# Patient Record
Sex: FEMALE | Race: WHITE | Employment: FULL TIME | URBAN - METROPOLITAN AREA
[De-identification: names, ages, dates, MRNs, and addresses within clinical notes are randomized per-mention and may not be internally consistent; named-entity substitution may affect disease eponyms.]

---

## 2019-10-24 ENCOUNTER — TRANSFERRED RECORDS (OUTPATIENT)
Dept: HEALTH INFORMATION MANAGEMENT | Facility: CLINIC | Age: 34
End: 2019-10-24

## 2019-10-25 ENCOUNTER — MEDICAL CORRESPONDENCE (OUTPATIENT)
Dept: HEALTH INFORMATION MANAGEMENT | Facility: CLINIC | Age: 34
End: 2019-10-25

## 2019-10-28 ENCOUNTER — OFFICE VISIT (OUTPATIENT)
Dept: MATERNAL FETAL MEDICINE | Facility: CLINIC | Age: 34
End: 2019-10-28
Attending: SPECIALIST
Payer: COMMERCIAL

## 2019-10-28 ENCOUNTER — PRE VISIT (OUTPATIENT)
Dept: MATERNAL FETAL MEDICINE | Facility: CLINIC | Age: 34
End: 2019-10-28

## 2019-10-28 ENCOUNTER — OFFICE VISIT (OUTPATIENT)
Dept: MATERNAL FETAL MEDICINE | Facility: CLINIC | Age: 34
End: 2019-10-28
Attending: OBSTETRICS & GYNECOLOGY
Payer: COMMERCIAL

## 2019-10-28 DIAGNOSIS — O26.90 PREGNANCY RELATED CONDITION, ANTEPARTUM: ICD-10-CM

## 2019-10-28 DIAGNOSIS — O26.90 PREGNANCY RELATED CONDITION, ANTEPARTUM: Primary | ICD-10-CM

## 2019-10-28 DIAGNOSIS — Z31.5 ENCOUNTER FOR PROCREATIVE GENETIC COUNSELING: Primary | ICD-10-CM

## 2019-10-28 DIAGNOSIS — O30.041 DICHORIONIC DIAMNIOTIC TWIN PREGNANCY IN FIRST TRIMESTER: Primary | ICD-10-CM

## 2019-10-28 PROCEDURE — 96040 ZZH GENETIC COUNSELING, EACH 30 MINUTES: CPT | Mod: ZF | Performed by: GENETIC COUNSELOR, MS

## 2019-10-28 NOTE — PROGRESS NOTES
[facility]  Genetic Counseling Consult    Patient: Emily Loving YOB: 1985   Date of Service: 10/28/19      Emily Loving was seen at [facility] for genetic consultation to discuss the options for care for twin pregnancy.  Emily was accompanied to her appointment today by her  Aleksandr.        Impression/Plan:   1.  Emily met with genetic counseling to discuss care and genetic testing options for twin pregnancies.  Emily reports that she is still gathering information to make her decision between continuing with a twin pregnancy or having selective fetal reduction.  She reports that the primary factor in her decision making process is mitigating overall risks for the pregnancy and her health.      2.  Emily met briefly with [facility] physician Dr. Hernandez to discuss the details of care for fetal reduction and after their conversation opted to plan for an appointment with [facility] in the 10th week of pregnancy to discuss and arrange appropriate care. Emily was also undecided on if she would pursue CVS prior to selective reduction.      Pregnancy History:   /Parity:    Age at Delivery: 34 year old  ARABELLA: 2020, by Last Menstrual Period  Gestational Age: 7w1d    No significant complications or exposures were reported in the current pregnancy.    Emily s pregnancy history is significant for 1 prior full term pregnancy with no reported complications.    Medical History:   Emily s reported medical history is not expected to impact pregnancy management or risks to fetal development.       Discussion:        Today the primary focus of our conversation was the differences in risks between continuing a twin pregnancy and reduction to weathers.  We discussed that all pregnancy has some inherent risk, and that unfortunately there is nothing that can completely remove the possibility of complications from  a pregnancy.  We discussed that twin gestations are at increased risk for  delivery, gestational diabetes, gestational hypertension, and preeclampsia.  Diego have a family member who delivered a twin pregnancy at ~25 weeks gestation.  This couple lost one twin and the other lives with reportedly profound developmental delays and health complications.  Emily acknowledges that this experience is a significant factor in her risk perceptions for her own pregnancy and how she thinks about risks for twin pregnancies.  We discussed the risks for selective reduction, which is associated with a 6-10% risk for pregnancy loss.  The decrease in relative risk for maternal and  complications after selective reduction is larger than the risk of performing a selective reduction in twin pregnancies.      Emily and Aleksandr had several questions regarding genetic screening and testing.  We discussed that for families pursuing selective reduction, CVS for diagnostic testing is recommended but not required.  This invasive testing does introduce additional risk to the pregnancy, but provides diagnostic health information regarding the genetic status of both twins, which can be beneficial for selecting a fetus for reduction.  We reviewed the limitations of NIPT in twin pregnancies, but discussed that this can be used as an alternative to CVS in some instances.  NIPT cannot distinguish between fetuses in a twin pregnancy however.        We discussed the following options:   Non-invasive Prenatal Testing (NIPT)    Maternal plasma cell-free DNA testing; first trimester ultrasound with nuchal translucency and nasal bone assessment is recommended, when appropriate    Screens for fetal trisomy 21, trisomy 13, trisomy 18, and sex chromosome aneuploidy    Cannot screen for open neural tube defects; maternal serum AFP after 15 weeks is recommended       Chorionic villus sampling (CVS)    Invasive procedure typically  performed in the first trimester by which placental villi are obtained for the purpose of chromosome analysis and/or other prenatal genetic analysis    Diagnostic results; >99% sensitivity for fetal chromosome abnormalities    Cannot test for open neural tube defects; maternal serum AFP after 15 weeks is recommended    The above information was reviewed with [facility] physician Dr. Hernandez, who met briefly with the family to introduce himself and discuss the logistics and technical details of a selective reduction procedure.  After meeting with him, the couple opted to plan to schedule a follow up appointment at 10 weeks gestation to coordinate further care.  All of the couple's questions were answered to her satisfaction today and she was encouraged to reach out to me with any questions or concerns moving forward.              It was a pleasure to be involved with Emily s care. Face-to-face time of the meeting was 30 minutes.      Faizan Guerin MS, [facility]  Licensed Genetic Counselor  Phone: [phone]  Pager: [phone]

## 2019-10-28 NOTE — PROGRESS NOTES
I have performed a consultation on Emily Loving regarding multifetal pregnancy reduction of a dichorionic twin pregnancy to a weathers IUP. Ms. Lovingis a 34 year old,  with an intrauterine pregnancy at 7w1d gestational age. Her LMP was 2019. Dating ultrasound on 2019 reported dichorionic and diamniotic twin gestation at 6w1d with ARABELLA of 2020. She conceived twins by conceived by IVF.     HPI: The patient has a history of a prior term delivery after IVF. She was delivered by  section at 38 weeks of a healthy infant secondary to breech presentation and PROM.    PMH: Medical: No major medical illnesses. Surgical: .   PHYSICAL EXAM: Deferred today. Patient appears alert, oriented.  GENERAL: Well developed, gravid female.    IMPRESSION: Dichorionic and diamniotic twin pregnancy at 7w1d by LMP and 6week US.    RECOMMENDATIONS:  1. We discussed maternal and fetal risks of twin gestation including  delivery, gestational diabetes, gestational hypertension and preeclampsia. We also discussed increased risk for requiring a  delivery and its attendant risks for hemorrhage and infection compared with vaginal delivery. Although the relative risks for these complications are relatively small, they are significant. Ms. Prado previously had a CD for her first pregnancy.  2. We discussed the fetal and  risks including but not limited to  delivery and its complications of CP, IVH, NEC and RDS. The patient related risk with twins without a history of  delivery is approximately 2-3x background risk for women without history of  delivery (12%).   3. We discussed the risks of multifetal pregnancy reduction. Although previously not frequently performed, current estimates are that 10-30% of reductions are performed for twin gestations. The decrease in relative risk of maternal and  complications after selective reduction  is larger than the risk of performing a selective reduction in twins which is associated with a pregnancy loss rate of 6-10%. The earlier the procedure can be performed, the safer the procedure is for the surviving fetus.  4. We discussed the recommendation to obtain screening and preferably diagnostic testing for aneuploidy with NIPT, NT scan or CVS. The patient is considering having CVS prior to reduction.  5. We discussed the procedure itself and risk for pregnancy loss, vaginal bleeding, cramping and rupture of membranes for the reduced or alternate sac. As noted above the overall risk is approximately 6-10%. We would recommend follow up 2 weeks after procedure to evaluate pregnancy. This could be done with primary care provider.  6. I have provided my contact information should any questions arise prior to the procedure.  At the conclusion of the conversation the patient had a clear grasp of the content of our discussion.    Tavares Hernandez MD  Maternal Fetal Medicine    I spent a total of 30 minutes face-to-face with this patient counseling and coordinating care as described above. More than 50% of the time was in coordination of care and discussion of management of care regarding multifetal reduction.    A copy of this consultation is being faxed to your office.

## 2019-10-29 DIAGNOSIS — O30.041 DICHORIONIC DIAMNIOTIC TWIN PREGNANCY IN FIRST TRIMESTER: Primary | ICD-10-CM

## 2019-11-04 ENCOUNTER — TELEPHONE (OUTPATIENT)
Dept: MATERNAL FETAL MEDICINE | Facility: CLINIC | Age: 34
End: 2019-11-04

## 2019-11-04 NOTE — TELEPHONE ENCOUNTER
11/4/2019    Emily called to touch base after an OB appointment earlier today.  She reports that she and Aleksandr are experience high levels of stress surrounding the pregnancy but that they are communicating very well with each other.  She reports that on an ultrasound today was unremarkable for either twin and that both twins seem to be developing without significant concern at this time.  Emily reports that she and her  are still deciding on appropriate course of action for their family, and that she has not completely decided between twin reduction as soon as possible, reduction after CVS, or continuing as a twin pregnancy.  Emily confirmed that she is currently scheduled for a CVS on 11/19/19.  Emily reports that she and Aleksandr have been discussing the implications of continuing as a twin pregnancy vs selective reduction and how either decision would impact them, their children, and their overall plans for their family, both in the short and long term.  I assured Emily that she will ultimately make the decision that is right for her family and our clinic will provide the appropriate care for them moving forward.  Emily expressed some frustration that she does not have many people to discuss this decision with, and reports that those people she would normally discuss important decisions with would not be understanding in this specific situation.  I encouraged Emily to continue discussing with her  and to reach out to her health care providers as needed.      Emily reports that they are still considering reduction without waiting for CVS, and wanted to know what plans/coordination should be started in order to ensure that this care is available as quickly as possible if they make that decision.  Genetic counseling will begin the process of arranging this care, with the understanding that Emily is not decided on any specific care plan yet, and will reach out to her with updates in the  near future.     All of Emily's questions were answered to her satisfaction at this time and she was encouraged to reach out with any questions or concerns.    Faizan Guerin MS, [facility]  Licensed Genetic Counselor  Phone: [phone]  Pager: [phone]

## 2019-11-15 ENCOUNTER — TELEPHONE (OUTPATIENT)
Dept: MATERNAL FETAL MEDICINE | Facility: CLINIC | Age: 34
End: 2019-11-15

## 2019-11-15 DIAGNOSIS — O30.041 DICHORIONIC DIAMNIOTIC TWIN PREGNANCY IN FIRST TRIMESTER: Primary | ICD-10-CM

## 2019-11-15 NOTE — TELEPHONE ENCOUNTER
11/15/2019    Called Emily to discuss her upcoming appointments with maternal fetal medicine.  Emily confirmed that she is planning to have a CVS as a part of those appointments to provide additional information for their decision regarding reducing to weathers pregnancy.  We discussed that as a part of this our clinic needs a confirmed blood type and that orders have been placed for this.  Emily will go to the lab for a STAT type and screen the morning of her CVS prior to her appointments in [facility].     We briefly reviewed the timelines for CVS results, with FISH results expected in 2-3 days and final results coming ~1 week after FISH results.      We reviewed that if Emily is planning to wait until after diagnostic test results are back before having a reduction, this care would likely take place the first week of December.  We discussed that there that as a part of coordinating this care Dr. Hernandez would review information regarding fetal reduction with Emily at her next appointment.  We also discussed that [place] financial counselors would investigate coverage for care for Emily, and she requested that a financial inquiry be started now for information purposes.      Emily asked for the CPT and ICD-10 codes for genetic testing and genetic counseling will collect this information and reach out to Emily ASAP.     All of Emily's questions were answered to her satisfaction at this time and she was encouraged to contact me with any questions or concerns.      Faizan Guerin MS, [facility]  Licensed Genetic Counselor  Phone: [phone]  Pager: [phone]

## 2019-11-18 ENCOUNTER — TELEPHONE (OUTPATIENT)
Dept: MATERNAL FETAL MEDICINE | Facility: CLINIC | Age: 34
End: 2019-11-18

## 2019-11-18 NOTE — TELEPHONE ENCOUNTER
11/18/2019    Called Emily to discuss her upcoming appointments with maternal fetal medicine.  We discussed the following topics:    Per our financial investigation, termination of pregnancy is a covered benefit whether elective or medically necessary.  We discussed that there may still be an out of pocket cost associated with her care.  Emily was grateful for this information at this time.     Emily requested the CPT codes and diagnosis codes for her CVS appointment and testing tomorrow, and was given the following information:    CVS CPT codes:   03257 CVS   46070 CVS US guide   [address]95 [facility] tri US   [address]774       Genetic testing on CVS specimen: 88271 x5; 88275 x5; 01287  35490, 88474; 97883,20086       Diagnosis codes o30.041 & Z31.49.    Emily returned my call and reports that all codes are covered except 23457, which requires a precertification initiated by our clinic.  I called Jyoti at Emily's request and began the precertification process, reference number [id number]    All of Emily's questions were answered to her satisfaction and she had no further questions or concerns at this time.     Faizan Guerin MS, [facility]  Licensed Genetic Counselor  Phone: [phone]  Pager: [phone]

## 2019-11-19 ENCOUNTER — OFFICE VISIT (OUTPATIENT)
Dept: MATERNAL FETAL MEDICINE | Facility: CLINIC | Age: 34
End: 2019-11-19
Attending: OBSTETRICS & GYNECOLOGY
Payer: COMMERCIAL

## 2019-11-19 ENCOUNTER — HOSPITAL ENCOUNTER (OUTPATIENT)
Dept: ULTRASOUND IMAGING | Facility: CLINIC | Age: 34
Discharge: HOME | End: 2019-11-19
Attending: OBSTETRICS & GYNECOLOGY | Admitting: OBSTETRICS & GYNECOLOGY
Payer: COMMERCIAL

## 2019-11-19 DIAGNOSIS — O30.041 DICHORIONIC DIAMNIOTIC TWIN PREGNANCY IN FIRST TRIMESTER: ICD-10-CM

## 2019-11-19 DIAGNOSIS — O30.041 DICHORIONIC DIAMNIOTIC TWIN PREGNANCY IN FIRST TRIMESTER: Primary | ICD-10-CM

## 2019-11-19 DIAGNOSIS — Z31.49 INVESTIGATION AND TESTING FOR PROCREATION MANAGEMENT: ICD-10-CM

## 2019-11-19 DIAGNOSIS — Z31.49 PROCREATION MANAGEMENT INVESTIGATION AND TESTING: ICD-10-CM

## 2019-11-19 DIAGNOSIS — Z31.49 ENCOUNTER FOR OTHER PROCREATIVE INVESTIGATION AND TESTING: ICD-10-CM

## 2019-11-19 LAB
ABO + RH BLD: NORMAL
ABO + RH BLD: NORMAL
BLD GP AB SCN SERPL QL: NORMAL
BLOOD BANK CMNT PATIENT-IMP: NORMAL
SPECIMEN EXP DATE BLD: NORMAL

## 2019-11-19 PROCEDURE — 81229 CYTOG ALYS CHRML ABNR SNPCGH: CPT | Performed by: OBSTETRICS & GYNECOLOGY

## 2019-11-19 PROCEDURE — 81265 STR MARKERS SPECIMEN ANAL: CPT | Performed by: OBSTETRICS & GYNECOLOGY

## 2019-11-19 PROCEDURE — 86850 RBC ANTIBODY SCREEN: CPT | Performed by: OBSTETRICS & GYNECOLOGY

## 2019-11-19 PROCEDURE — 84999 UNLISTED CHEMISTRY PROCEDURE: CPT | Performed by: OBSTETRICS & GYNECOLOGY

## 2019-11-19 PROCEDURE — 86901 BLOOD TYPING SEROLOGIC RH(D): CPT | Performed by: OBSTETRICS & GYNECOLOGY

## 2019-11-19 PROCEDURE — 76945 ECHO GUIDE VILLUS SAMPLING: CPT | Performed by: OBSTETRICS & GYNECOLOGY

## 2019-11-19 PROCEDURE — 76801 OB US < 14 WKS SINGLE FETUS: CPT

## 2019-11-19 PROCEDURE — 86900 BLOOD TYPING SEROLOGIC ABO: CPT | Performed by: OBSTETRICS & GYNECOLOGY

## 2019-11-19 PROCEDURE — 40001082 ZZHCL STATISTIC MATERNAL CELL CONTAM MOLEC: Performed by: OBSTETRICS & GYNECOLOGY

## 2019-11-19 PROCEDURE — 36416 COLLJ CAPILLARY BLOOD SPEC: CPT | Performed by: OBSTETRICS & GYNECOLOGY

## 2019-11-19 PROCEDURE — 96040 ZZH GENETIC COUNSELING, EACH 30 MINUTES: CPT | Mod: ZF | Performed by: GENETIC COUNSELOR, MS

## 2019-11-19 NOTE — PROGRESS NOTES
[facility] [place]  Genetic Counseling Consult    Patient: Emily Loving YOB: 1985   Date of Service: 11/19/19      Emily Loving was seen at [facility] [place] for genetic consultation as a part of her appointment for CVS in twin pregnancy.       Impression/Plan:   1.  Emily had an ultrasound and CVS procedure.  The following testing was ordered on the prenatal sample: Chromosome Microarray.  Results are expected within 7-10 days, and will be available in [facility].  We will contact her to discuss the results, and a copy will be forwarded to the office of the referring OB provider.  Emily will be contacted at the phone number she provided, [phone], and requests that detailed results be left in her voicemail if she cannot be reached.    2.  Results will be reported separately for Twin A and Twin B.  We discussed that due to the upcoming Thanksgiving holiday there may be a slight delay in getting her test results back, and that genetic counseling will plan to update Emily with her test status on Wednesday, November 27.      Risk Assessment for Chromosome Conditions:   We explained that the risk for fetal chromosome abnormalities increases with maternal age. We discussed specific features of common chromosome abnormalities, including Down syndrome, trisomy 13, trisomy 18, and sex chromosome trisomies.      - At age 34 at midtrimester, the risk to have a baby with Down syndrome is 1 in 421.     - At age 34 at midtrimester, the risk to have a baby with any chromosome abnormality is 1 in 217.    These estimates reflect the risk for a single conception.  In a monozygotic twin pregnancy, each twin received their DNA from the same conception, and the twins are expected to have the same complement of chromosomes.  The risk numbers above would accurately reflect the risks for a chromosome abnormality to be affecting both twins in a  monozygotic twin pregnancy.  In a dizygotic twin pregnancy, each twin comes from a separate conception and has separate risks for chromosome abnormalities.  In a dizygotic pregnancy, the risks above reflect the risk to each individual fetus to have a chromosome abnormality, and the overall risk for the pregnancy as a whole to have either fetus affected with a chromosome abnormality are doubled.  Zygosity cannot be definitively determined by ultrasound, however, diamniotic dichorionic twin pregnancies are most likely to be dizygotic as well.  This means that based on available information the best estimates for overall risks for the pregnancy to have any twin affected with a common chromosome abnormality is 1/108.5 or ~1%.    We discussed various testing strategies including FISH analysis for rapid, but not diagnostic, results, chromosome analysis which assess the entire range of chromosomes for large scale numerical and structural differences, and chromosome microarray, which is a diagnostic assessment that encompasses a much wider range of clinical conditions including microdeletion and microduplication syndromes.  Because Emily and Aleksandr are planning to use these test results as a part of their decision making process regarding continuation of the pregnancy, they opted to pursue the most comprehensive testing.  We discussed that this would be chromosome microarray.  We discussed that microarray testing can have a shorter turn around time than chromosome analysis as well, and this is important for the couple given the time sensitive nature of the planning decisions.      This information was reviewed with the [facility] physician performing her CVS, Dr. Emmanuel, who is in agreement with this plan of testing.  All appropriate consent was obtained for today's procedure and all ordered genetic testing.          It was a pleasure to be involved with Emily s care. Face-to-face time of the meeting was 30  minutes.      Faizan Guerin MS, [facility]  Licensed Genetic Counselor  Phone: [phone]  Pager: [phone]

## 2019-11-19 NOTE — PROGRESS NOTES
Pt here for Chorionic Villi Sampling d/t di/di twins, possible reduction. See genetic counseling dictation.  After consent signed and TimeOut completed, Dr. Emmanuel withdrew adequate villi x2 transabdominal pass.  Maternal cell contamination bloodwork drawn right arm.  Pt is RH positive.  MD reviewed blood type and screen and Rhogam ordered. Rhogam not given today. Discharge teaching completed and questions answered.   Patient reports slight pain/ cramping.  Pt discharged ambulatory and stable.  Charge tech pager called to notify of specimen, specimen transported to [facility] and handed off to Anca.  Work-aid completed, signed and accompanied specimen.  CHAPITO Bates RN

## 2019-11-21 DIAGNOSIS — O30.041 DICHORIONIC DIAMNIOTIC TWIN PREGNANCY IN FIRST TRIMESTER: Primary | ICD-10-CM

## 2019-11-26 ENCOUNTER — TELEPHONE (OUTPATIENT)
Dept: MATERNAL FETAL MEDICINE | Facility: CLINIC | Age: 34
End: 2019-11-26

## 2019-11-26 LAB
CHROM ANALY RESULT (ISCN): NORMAL
CHROM ANALY RESULT (ISCN): NORMAL
PATHOLOGY STUDY: NORMAL
PATHOLOGY STUDY: NORMAL
SERVICE CMNT-IMP: YES
SERVICE CMNT-IMP: YES
SPECIMEN SOURCE: NORMAL
SPECIMEN SOURCE: NORMAL

## 2019-11-26 NOTE — TELEPHONE ENCOUNTER
11/26/2019     Called Emily to discuss results from her CVS.  Final results are normal male pattern for both twin A and twin B:  Arr(1-22)x2(X,Y)x1.  This result indicates that for each twin, no clinically significant deletions or duplications of DNA material were detected.  Emily had CVS done as a part of planning for reduction to weathers pregnancy and we discussed that these results did not identify either twin as having any significant genetic health conditions to impact that decision.  Emily had no questions at this time and was encouraged to contact genetic counseling with any questions or concerns moving forward.  Emily confirmed her upcoming appointment for fetal reduction 12/5 at 1:30 PM.     Faizan Guerin MS, [facility]  Licensed Genetic Counselor  Phone: [phone]  Pager: [phone]

## 2019-11-27 PROBLEM — O30.049 DICHORIONIC DIAMNIOTIC TWIN PREGNANCY: Status: ACTIVE | Noted: 2019-11-27

## 2019-12-02 NOTE — PROGRESS NOTES
"Please see \"Imaging\" tab under \"Chart Review\" for details of today's visit.    Dionicio Emmanuel    "

## 2019-12-05 ENCOUNTER — OFFICE VISIT (OUTPATIENT)
Dept: MATERNAL FETAL MEDICINE | Facility: CLINIC | Age: 34
End: 2019-12-05
Attending: OBSTETRICS & GYNECOLOGY
Payer: COMMERCIAL

## 2019-12-05 ENCOUNTER — HOSPITAL ENCOUNTER (OUTPATIENT)
Dept: ULTRASOUND IMAGING | Facility: CLINIC | Age: 34
Discharge: HOME | End: 2019-12-05
Attending: OBSTETRICS & GYNECOLOGY | Admitting: OBSTETRICS & GYNECOLOGY
Payer: COMMERCIAL

## 2019-12-05 DIAGNOSIS — O30.041 DICHORIONIC DIAMNIOTIC TWIN PREGNANCY IN FIRST TRIMESTER: ICD-10-CM

## 2019-12-05 DIAGNOSIS — O30.041 DICHORIONIC DIAMNIOTIC TWIN PREGNANCY IN FIRST TRIMESTER: Primary | ICD-10-CM

## 2019-12-05 PROCEDURE — 76801 OB US < 14 WKS SINGLE FETUS: CPT

## 2019-12-05 NOTE — PROGRESS NOTES
Please refer to ultrasound report under 'Imaging' Studies of 'Chart Review' tabs.    Tavares Hernandez M.D.

## 2019-12-09 ENCOUNTER — TELEPHONE (OUTPATIENT)
Dept: MATERNAL FETAL MEDICINE | Facility: CLINIC | Age: 34
End: 2019-12-09

## 2019-12-09 DIAGNOSIS — Z31.49 INVESTIGATION AND TESTING FOR PROCREATION MANAGEMENT: ICD-10-CM

## 2019-12-09 DIAGNOSIS — O30.041 DICHORIONIC DIAMNIOTIC TWIN PREGNANCY IN FIRST TRIMESTER: Primary | ICD-10-CM

## 2019-12-09 NOTE — TELEPHONE ENCOUNTER
12/9/2019    Called Emily to follow up with several concerns from her previous MFM appointment.  Genetic counseling is working to put together information regarding risks and complications of premature delivery at various stages of prematurity, as this information was requested at her last appointment.  Genetic counseling will reach out to Emily soon with this information.     Emily confirmed her upcoming MFM appointment 12/12 and the couple's plan for continuing the pregnancy as a twin pregnancy under close surveillance for the time being.  We reviewed the ultrasound findings from her previous scans and discussed that the consistently smaller gestational sac for 1 twin is not necessarily directly associated with adverse pregnancy outcomes, but that our MFM team will continue to monitor this for the couple.      Finally we discussed the option for performing twin zygosity studies on the CVS specimens from her pregnancy.  We discussed that twin zygosity studies examine stretches of DNA from each specimen to determine if they are genetically identical or distinct.  We discussed that this can be useful for determining if twins are fraternal (dizygotic) or paternal (monozygotic) twins.  Dichorionic/diamniotic twin pregnancies are more likely to be dizygotic as well, but zygosity cannot be definitively determined by ultrasound.      Zygosity testing for the specimens will determine if the two samples collected are genetically distinct from each other.  We discussed that a dizygotic test result would confirm that Emily's CVS procedure successfully sampled each placenta individually, and that the two normal microarray results correspond to twins A and B, respectively.      If zygosity testing indicates monozygous samples, there is no way to distinguish between the twins being monozygotic or a CVS sampling error in which a single placenta was sampled twice. Because there is a natural chance for the twins to be  monozygotic, zygosity testing has the potential to add significant uncertainty to Emily's ongoing pregnancy, and amniocentesis would be the only way to clarify that uncertainty.  Emily reports understanding this information and opts to move forward with zygosity testing.  Results are expected in 7-10 days and we will contact her to discuss.     Faizan Guerin MS, [facility]  Licensed Genetic Counselor  Phone: [phone]  Pager: [phone]

## 2019-12-12 ENCOUNTER — OFFICE VISIT (OUTPATIENT)
Dept: MATERNAL FETAL MEDICINE | Facility: CLINIC | Age: 34
End: 2019-12-12
Attending: OBSTETRICS & GYNECOLOGY
Payer: COMMERCIAL

## 2019-12-12 ENCOUNTER — HOSPITAL ENCOUNTER (OUTPATIENT)
Dept: ULTRASOUND IMAGING | Facility: CLINIC | Age: 34
Discharge: HOME | End: 2019-12-12
Attending: OBSTETRICS & GYNECOLOGY | Admitting: OBSTETRICS & GYNECOLOGY
Payer: COMMERCIAL

## 2019-12-12 DIAGNOSIS — O30.041 DICHORIONIC DIAMNIOTIC TWIN PREGNANCY IN FIRST TRIMESTER: Primary | ICD-10-CM

## 2019-12-12 DIAGNOSIS — O30.041 DICHORIONIC DIAMNIOTIC TWIN PREGNANCY IN FIRST TRIMESTER: ICD-10-CM

## 2019-12-12 PROCEDURE — 76815 OB US LIMITED FETUS(S): CPT

## 2019-12-17 ENCOUNTER — TELEPHONE (OUTPATIENT)
Dept: OBGYN | Facility: CLINIC | Age: 34
End: 2019-12-17

## 2019-12-17 NOTE — TELEPHONE ENCOUNTER
Received call from Radiology that patient has ultrasound scheduled on Friday, 12/20 and they do not do twin pregnancy ultrasounds and she also just had an ultrasound at [facility] so they are wondering if she really needs the ultrasound.    Review of [facility] shows ultrasound is not needed due to [facility] ultrasounds and it was cancelled.    Spoke with Emily and informed her of change. She had several questions regarding this and all were answered.  Of note, it appears her gestational age listed in Epic may need to be changed to reflect what is reported in [facility] ultrasounds.  Put appointment note on for provider to address at her next visit.

## 2019-12-18 ENCOUNTER — TELEPHONE (OUTPATIENT)
Dept: MATERNAL FETAL MEDICINE | Facility: CLINIC | Age: 34
End: 2019-12-18

## 2019-12-18 DIAGNOSIS — O30.041 DICHORIONIC DIAMNIOTIC TWIN PREGNANCY IN FIRST TRIMESTER: Primary | ICD-10-CM

## 2019-12-18 NOTE — TELEPHONE ENCOUNTER
12/18/2019    Called Emily to discuss results of the twin zygosity studies from her ongoing pregnancy.  Results are dizygotic twins.  This means that the two CVS samples submitted are from two genetically distinct individuals.  This result indicates that the twins are fraternal twins, and that CVS successful sampled each twin independently, confirming normal male array for each twin.  Emily had no questions at this time and reports that she has an upcoming appointment to establish OB care and discussed making an appointment for a 16 week early comprehensive ultrasound, which was recommended at her last [facility] appointment.  Emily had no other questions at this time and was encouraged to remain in contact with any questions or concerns in the future.     Faizan Guerin MS, [facility]  Licensed Genetic Counselor  Phone: [phone]  Pager: [phone]

## 2019-12-20 ENCOUNTER — ANCILLARY PROCEDURE (OUTPATIENT)
Dept: ULTRASOUND IMAGING | Facility: CLINIC | Age: 34
End: 2019-12-20
Attending: ADVANCED PRACTICE MIDWIFE
Payer: COMMERCIAL

## 2019-12-20 ENCOUNTER — OFFICE VISIT (OUTPATIENT)
Dept: OBGYN | Facility: CLINIC | Age: 34
End: 2019-12-20
Attending: ADVANCED PRACTICE MIDWIFE
Payer: COMMERCIAL

## 2019-12-20 VITALS
DIASTOLIC BLOOD PRESSURE: 63 MMHG | SYSTOLIC BLOOD PRESSURE: 97 MMHG | HEART RATE: 97 BPM | BODY MASS INDEX: 20.14 KG/M2 | WEIGHT: 118 LBS | HEIGHT: 64 IN

## 2019-12-20 DIAGNOSIS — O09.90 SUPERVISION OF HIGH RISK PREGNANCY, ANTEPARTUM: Primary | ICD-10-CM

## 2019-12-20 DIAGNOSIS — Z31.49 ENCOUNTER FOR OTHER PROCREATIVE INVESTIGATION AND TESTING: ICD-10-CM

## 2019-12-20 DIAGNOSIS — O30.041 DICHORIONIC DIAMNIOTIC TWIN PREGNANCY IN FIRST TRIMESTER: Primary | ICD-10-CM

## 2019-12-20 DIAGNOSIS — O34.219 PREVIOUS CESAREAN DELIVERY, ANTEPARTUM CONDITION OR COMPLICATION: ICD-10-CM

## 2019-12-20 DIAGNOSIS — O09.90 SUPERVISION OF HIGH RISK PREGNANCY, ANTEPARTUM: ICD-10-CM

## 2019-12-20 LAB
LAB SCANNED RESULT: NORMAL
LAB SCANNED RESULT: NORMAL

## 2019-12-20 PROCEDURE — 76810 OB US >/= 14 WKS ADDL FETUS: CPT

## 2019-12-20 PROCEDURE — G0463 HOSPITAL OUTPT CLINIC VISIT: HCPCS | Mod: ZF

## 2019-12-20 RX ORDER — OMEGA-3-ACID ETHYL ESTERS 1 G/1
2 CAPSULE, LIQUID FILLED ORAL 2 TIMES DAILY
COMMUNITY

## 2019-12-20 RX ORDER — MULTIVITAMIN WITH IRON
1 TABLET ORAL DAILY
COMMUNITY

## 2019-12-20 RX ORDER — LETROZOLE 2.5 MG/1
TABLET, FILM COATED ORAL
COMMUNITY
Start: 2019-09-11 | End: 2019-12-20

## 2019-12-20 RX ORDER — PRENATAL VIT/IRON FUM/FOLIC AC 27MG-0.8MG
1 TABLET ORAL DAILY
COMMUNITY

## 2019-12-20 RX ORDER — CLOMIPHENE CITRATE 50 MG/1
TABLET ORAL
COMMUNITY
Start: 2019-06-05 | End: 2019-12-20

## 2019-12-20 RX ORDER — ESTRADIOL 2 MG/1
TABLET ORAL
COMMUNITY
Start: 2019-06-05 | End: 2019-12-20

## 2019-12-20 RX ORDER — CHORIOGONADOTROPIN ALFA 250 UG/.5ML
INJECTION, SOLUTION SUBCUTANEOUS
COMMUNITY
Start: 2019-09-18 | End: 2019-12-20

## 2019-12-20 ASSESSMENT — ANXIETY QUESTIONNAIRES
6. BECOMING EASILY ANNOYED OR IRRITABLE: NOT AT ALL
1. FEELING NERVOUS, ANXIOUS, OR ON EDGE: NOT AT ALL
GAD7 TOTAL SCORE: 0
2. NOT BEING ABLE TO STOP OR CONTROL WORRYING: NOT AT ALL
5. BEING SO RESTLESS THAT IT IS HARD TO SIT STILL: NOT AT ALL
7. FEELING AFRAID AS IF SOMETHING AWFUL MIGHT HAPPEN: NOT AT ALL
3. WORRYING TOO MUCH ABOUT DIFFERENT THINGS: NOT AT ALL

## 2019-12-20 ASSESSMENT — PATIENT HEALTH QUESTIONNAIRE - PHQ9
5. POOR APPETITE OR OVEREATING: NOT AT ALL
SUM OF ALL RESPONSES TO PHQ QUESTIONS 1-9: 0

## 2019-12-20 ASSESSMENT — MIFFLIN-ST. JEOR: SCORE: 1225.24

## 2019-12-20 ASSESSMENT — PAIN SCALES - GENERAL: PAINLEVEL: NO PAIN (0)

## 2019-12-20 NOTE — NURSING NOTE
Chief Complaint   Patient presents with     Prenatal Care     OB intake 14 weeks and 5 days   Mila Cutler LPN

## 2019-12-20 NOTE — PROGRESS NOTES
[facility] OB Intake note  Subjective   34 year old woman presents to clinic for initiation of OB care. Patient's last menstrual period was 2019.  at 14w5d by Estimated Date of Delivery: 2020 based on US confirms. Reviewed dating ultrasound. Pregnancy is planned.      Symptoms since LMP include nausea, bowel changes, breast tenderness and fatigue. Patient has tried these relief measures: small frequent meals, increased rest and increased fluids.    Pt reports n/v much better now.  Pt is very anxious about viability d/t abnormal US at [facility] two weeks ago.  (Did have normal follow up a week later). Has level 2 US scheduled.     -Pt has already had genetic counseling   Pt  does not have a recent known exposure to Parvo or CMV so IgG/IgM testing WILL NOT be ordered.   Have you traveled during the pregnancy?Yes, If yes, where[place], [place] partner(s[place] travelled during the pregnancy?Yes, If yes, where?[place]  - Current Medications   Current Outpatient Medications   Medication Sig Dispense Refill     magnesium 250 MG tablet Take 1 tablet by mouth daily       omega-3 acid ethyl esters (LOVAZA) 1 g capsule Take 2 g by mouth 2 times daily       Prenatal Vit-Fe Fumarate-FA (PRENATAL MULTIVITAMIN W/IRON) 27-0.8 MG tablet Take 1 tablet by mouth daily           - Co-morbids  History reviewed. No pertinent past medical history.  - Risk for GDM -  has No known risk factors for GDM WILL NOT have an early GCT and possible Hgb A1C    - High Risk for Pre E-  Has Current multi fetal gestation so needs to discuss start low dose aspirin (81mg) starting between 12 and 28 weeks to prevent early onset preeclampsia.    - Moderate risk - has moderate risk factors for Pre E including No moderate risk factors   Since she meets one of the moderate risk facrtors for Pre E -   so needs to discuss starting low dose aspirin (81mg) starting between 12 and 28 weeks to prevent early onset preeclampsia.    - The patient  does  have a history of spontaneous  birth so  WILL NOT consider progesterone starting at 16-20 weeks and/or serial transvaginal cervical length ultrasounds from 16-24 weeks.     -The patient does not have a history of immunosuppresion or HIV so Toxoplasma IgG/IgM WILL NOT be ordered.     PERSONAL/SOCIAL HISTORY    lives with their family.  Employment: Full time. Her job involves sedentary activity .  History of anxiety or depression  NO- I    Objective  -VS: reviewed and within normal limits   -General appearance: no acute distress, patient is comfortable   NEUROLOGICAL/PSYCHIATRIC   - Orientated x3,   -Mood and affect: : normal     Assessment/Plan  Emily was seen today for prenatal care.    Diagnoses and all orders for this visit:    Supervision of high risk pregnancy, antepartum  -     US OB >14 Weeks Multiple; Future    Previous  delivery, antepartum condition or complication        34 year old  14w5d weeks of pregnancy with ARABELLA of 2020 by LMP of Patient's last menstrual period was 2019.. Ultrasound confirms.   Outpatient Encounter Medications as of 2019   Medication Sig Dispense Refill     magnesium 250 MG tablet Take 1 tablet by mouth daily       omega-3 acid ethyl esters (LOVAZA) 1 g capsule Take 2 g by mouth 2 times daily       Prenatal Vit-Fe Fumarate-FA (PRENATAL MULTIVITAMIN W/IRON) 27-0.8 MG tablet Take 1 tablet by mouth daily       [DISCONTINUED] clomiPHENE (CLOMID) 50 MG tablet        [DISCONTINUED] estradiol (ESTRACE) 2 MG tablet        [DISCONTINUED] GONAL-F RFF REDIJECT 300 UNIT/0.5ML SC SOLN        [DISCONTINUED] letrozole (FEMARA) 2.5 MG tablet        [DISCONTINUED] OVIDREL 250 MCG/0.5ML syringe SC INJ        [DISCONTINUED] progesterone (PROMETRIUM) 200 MG capsule        No facility-administered encounter medications on file as of 2019.       Orders Placed This Encounter   Procedures     US OB >14 Weeks Multiple                 Orders Placed This  Encounter   Procedures     US OB >14 Weeks Multiple     -For constipation, discussed psyllium seed, increased dietary fiber, and/or colace.   -Pt sending labs via email to clinic.   - Oriented to Practice, types of care, and how to reach a provider.  Pt prefers MD team  - Patient received 1st trimester new OB education packet complete with aide of [employer] booklet including information on genetic screening test options.  - Patient has already seen genetic testig  - Educational handout on the prevention of infections diseases during pregnancy provided.  - Patient was encouraged to start prenatal vitamins as tolerated.    - Patient is sending us her labs from previous care.   - Please discuss  low dose aspirin daily to prevent pre eclampsia at NOB visit.   - Pregnancy concerns to be addressed by provider at new OB exam include:twin pregnancy,  previous C/S x1.    Pt to RTO for NOB visit in 2 weeks with MD and prn if questions or concerns    DAVION Robertson CNM

## 2019-12-20 NOTE — LETTER
2019       RE: Emily Loving  [address]  [place] [address]     Dear Colleague,    Thank you for referring your patient, Emily Loving, to the [facility]. Please see a copy of my visit note below.    [facility] OB Intake note  Subjective   34 year old woman presents to clinic for initiation of OB care. Patient's last menstrual period was 2019.  at 14w5d by Estimated Date of Delivery: 2020 based on US confirms. Reviewed dating ultrasound. Pregnancy is planned.      Symptoms since LMP include nausea, bowel changes, breast tenderness and fatigue. Patient has tried these relief measures: small frequent meals, increased rest and increased fluids.    Pt reports n/v much better now.  Pt is very anxious about viability d/t abnormal US at [facility] two weeks ago.  (Did have normal follow up a week later). Has level 2 US scheduled.     -Pt has already had genetic counseling   Pt  does not have a recent known exposure to Parvo or CMV so IgG/IgM testing WILL NOT be ordered.   Have you traveled during the pregnancy?Yes, If yes, where? [place], [place] your sexual partner(s) travelled during the pregnancy?Yes, If yes, where?[place]  - Current Medications   Current Outpatient Medications   Medication Sig Dispense Refill     magnesium 250 MG tablet Take 1 tablet by mouth daily       omega-3 acid ethyl esters (LOVAZA) 1 g capsule Take 2 g by mouth 2 times daily       Prenatal Vit-Fe Fumarate-FA (PRENATAL MULTIVITAMIN W/IRON) 27-0.8 MG tablet Take 1 tablet by mouth daily           - Co-morbids  History reviewed. No pertinent past medical history.  - Risk for GDM -  has No known risk factors for GDM WILL NOT have an early GCT and possible Hgb A1C    - High Risk for Pre E-  Has Current multi fetal gestation so needs to discuss start low dose aspirin (81mg) starting between 12 and 28 weeks to prevent early onset preeclampsia.    -  Moderate risk - has moderate risk factors for Pre E including No moderate risk factors   Since she meets one of the moderate risk facrtors for Pre E -   so needs to discuss starting low dose aspirin (81mg) starting between 12 and 28 weeks to prevent early onset preeclampsia.    - The patient  does have a history of spontaneous  birth so  WILL NOT consider progesterone starting at 16-20 weeks and/or serial transvaginal cervical length ultrasounds from 16-24 weeks.     -The patient does not have a history of immunosuppresion or HIV so Toxoplasma IgG/IgM WILL NOT be ordered.     PERSONAL/SOCIAL HISTORY    lives with their family.  Employment: Full time. Her job involves sedentary activity .  History of anxiety or depression  NO- I    Objective  -VS: reviewed and within normal limits   -General appearance: no acute distress, patient is comfortable   NEUROLOGICAL/PSYCHIATRIC   - Orientated x3,   -Mood and affect: : normal     Assessment/Plan  Emily was seen today for prenatal care.    Diagnoses and all orders for this visit:    Supervision of high risk pregnancy, antepartum  -     US OB >14 Weeks Multiple; Future    Previous  delivery, antepartum condition or complication        34 year old  14w5d weeks of pregnancy with ARABELLA of 2020 by LMP of Patient's last menstrual period was 2019.. Ultrasound confirms.   Outpatient Encounter Medications as of 2019   Medication Sig Dispense Refill     magnesium 250 MG tablet Take 1 tablet by mouth daily       omega-3 acid ethyl esters (LOVAZA) 1 g capsule Take 2 g by mouth 2 times daily       Prenatal Vit-Fe Fumarate-FA (PRENATAL MULTIVITAMIN W/IRON) 27-0.8 MG tablet Take 1 tablet by mouth daily       [DISCONTINUED] clomiPHENE (CLOMID) 50 MG tablet        [DISCONTINUED] estradiol (ESTRACE) 2 MG tablet        [DISCONTINUED] GONAL-F RFF REDIJECT 300 UNIT/0.5ML SC SOLN        [DISCONTINUED] letrozole (FEMARA) 2.5 MG tablet         [DISCONTINUED] OVIDREL 250 MCG/0.5ML syringe SC INJ        [DISCONTINUED] progesterone (PROMETRIUM) 200 MG capsule        No facility-administered encounter medications on file as of 12/20/2019.       Orders Placed This Encounter   Procedures     US OB >14 Weeks Multiple     Orders Placed This Encounter   Procedures     US OB >14 Weeks Multiple     -For constipation, discussed psyllium seed, increased dietary fiber, and/or colace.   -Pt sending labs via email to clinic.   - Oriented to Practice, types of care, and how to reach a provider.  Pt prefers MD team  - Patient received 1st trimester new OB education packet complete with aide of [employer] booklet including information on genetic screening test options.  - Patient has already seen genetic testig  - Educational handout on the prevention of infections diseases during pregnancy provided.  - Patient was encouraged to start prenatal vitamins as tolerated.    - Patient is sending us her labs from previous care.   - Please discuss  low dose aspirin daily to prevent pre eclampsia at NOB visit.   - Pregnancy concerns to be addressed by provider at new OB exam include:twin pregnancy,  previous C/S x1.    Pt to RTO for NOB visit in 2 weeks with MD and prn if questions or concerns    DAVION Robertson CNM

## 2019-12-21 ASSESSMENT — ANXIETY QUESTIONNAIRES: GAD7 TOTAL SCORE: 0

## 2020-01-03 ENCOUNTER — HOSPITAL ENCOUNTER (OUTPATIENT)
Dept: ULTRASOUND IMAGING | Facility: CLINIC | Age: 35
Discharge: HOME | End: 2020-01-03
Attending: OBSTETRICS & GYNECOLOGY | Admitting: OBSTETRICS & GYNECOLOGY
Payer: COMMERCIAL

## 2020-01-03 ENCOUNTER — OFFICE VISIT (OUTPATIENT)
Dept: MATERNAL FETAL MEDICINE | Facility: CLINIC | Age: 35
End: 2020-01-03
Attending: OBSTETRICS & GYNECOLOGY
Payer: COMMERCIAL

## 2020-01-03 DIAGNOSIS — O30.041 DICHORIONIC DIAMNIOTIC TWIN PREGNANCY IN FIRST TRIMESTER: ICD-10-CM

## 2020-01-03 DIAGNOSIS — O30.042 DICHORIONIC DIAMNIOTIC TWIN PREGNANCY IN SECOND TRIMESTER: Primary | ICD-10-CM

## 2020-01-03 LAB — MATERNAL CELL CONTAMINATION MOL ANALYSIS: NORMAL

## 2020-01-03 PROCEDURE — 76805 OB US >/= 14 WKS SNGL FETUS: CPT

## 2020-01-04 ENCOUNTER — TELEPHONE (OUTPATIENT)
Dept: MATERNAL FETAL MEDICINE | Facility: CLINIC | Age: 35
End: 2020-01-04

## 2020-01-04 NOTE — TELEPHONE ENCOUNTER
Patient declined scheduling L2 Twins with [facility]. She said she will be out of town in the month of February and will be having this US done with a provider in [place], [place].     Referring clinic notified. Removing order.    Abigail Wheatley,    , [facility]

## 2020-03-11 ENCOUNTER — HEALTH MAINTENANCE LETTER (OUTPATIENT)
Age: 35
End: 2020-03-11

## 2020-05-15 ENCOUNTER — HOSPITAL ENCOUNTER (INPATIENT)
Facility: CLINIC | Age: 35
Setting detail: SURGERY ADMIT
End: 2020-05-15
Attending: OBSTETRICS & GYNECOLOGY | Admitting: OBSTETRICS & GYNECOLOGY
Payer: COMMERCIAL

## 2020-06-01 DIAGNOSIS — Z11.59 ENCOUNTER FOR SCREENING FOR OTHER VIRAL DISEASES: Primary | ICD-10-CM

## 2021-01-03 ENCOUNTER — HEALTH MAINTENANCE LETTER (OUTPATIENT)
Age: 36
End: 2021-01-03

## 2021-04-25 ENCOUNTER — HEALTH MAINTENANCE LETTER (OUTPATIENT)
Age: 36
End: 2021-04-25

## 2021-10-10 ENCOUNTER — HEALTH MAINTENANCE LETTER (OUTPATIENT)
Age: 36
End: 2021-10-10

## 2022-05-22 ENCOUNTER — HEALTH MAINTENANCE LETTER (OUTPATIENT)
Age: 37
End: 2022-05-22

## 2022-09-18 ENCOUNTER — HEALTH MAINTENANCE LETTER (OUTPATIENT)
Age: 37
End: 2022-09-18

## 2023-06-04 ENCOUNTER — HEALTH MAINTENANCE LETTER (OUTPATIENT)
Age: 38
End: 2023-06-04